# Patient Record
Sex: FEMALE | Race: ASIAN | URBAN - METROPOLITAN AREA
[De-identification: names, ages, dates, MRNs, and addresses within clinical notes are randomized per-mention and may not be internally consistent; named-entity substitution may affect disease eponyms.]

---

## 2020-11-18 ENCOUNTER — OFFICE VISIT (OUTPATIENT)
Dept: URBAN - METROPOLITAN AREA CLINIC 27 | Facility: CLINIC | Age: 65
End: 2020-11-18
Payer: MEDICARE

## 2020-11-18 DIAGNOSIS — E11.9 TYPE 2 DM W/O COMPLICATION: ICD-10-CM

## 2020-11-18 DIAGNOSIS — H25.12 AGE-RELATED NUCLEAR CATARACT, LEFT EYE: ICD-10-CM

## 2020-11-18 DIAGNOSIS — H04.123 DRY EYE SYNDROME OF BILATERAL LACRIMAL GLANDS: ICD-10-CM

## 2020-11-18 PROCEDURE — 92235 FLUORESCEIN ANGRPH MLTIFRAME: CPT | Performed by: OPHTHALMOLOGY

## 2020-11-18 PROCEDURE — 92134 CPTRZ OPH DX IMG PST SGM RTA: CPT | Performed by: OPHTHALMOLOGY

## 2020-11-18 PROCEDURE — 99214 OFFICE O/P EST MOD 30 MIN: CPT | Performed by: OPHTHALMOLOGY

## 2020-11-18 ASSESSMENT — INTRAOCULAR PRESSURE
OD: 15
OS: 18

## 2020-11-18 NOTE — IMPRESSION/PLAN
Impression: Type 2 DM w/o complications, OU. Status: Symptomatic. Plan: DM since 2016. An IVFA from 05/08/2020 demonstrated no NVE OU. Fundus photos from 05/08/2020 demonstrated no NVD. No BDR. Bs control. Observe.

## 2020-11-18 NOTE — IMPRESSION/PLAN
Impression: Retinal edema, OD. Status: Symptomatic. JXT OD Plan: Exam and OCT reveal macular thickening OD (353 microns, from 480 microns). An IVFA from 11/18/2020 demonstrated mild telangiectasia. Fundus photos from 11/18/2020 demonstrated no IRH. If there is worsening, will consider Triesence. Thanks, Ruel Conroy. 

Return in 6 months, OCT OU, IVFA OD 1st, possible Triesence

## 2020-11-18 NOTE — IMPRESSION/PLAN
Impression: h/o ERM, OD. Status: Symptomatic.
s/p PPV / MP ILM / ICG / PRP / air 2/5/18 ([address] [place] [address]) Plan: The patient has done well with surgery. Follow.

## 2021-06-30 ENCOUNTER — OFFICE VISIT (OUTPATIENT)
Dept: URBAN - METROPOLITAN AREA CLINIC 27 | Facility: CLINIC | Age: 66
End: 2021-06-30
Payer: MEDICARE

## 2021-06-30 DIAGNOSIS — H43.813 VITREOUS DEGENERATION, BILATERAL: ICD-10-CM

## 2021-06-30 PROCEDURE — 99214 OFFICE O/P EST MOD 30 MIN: CPT | Performed by: OPHTHALMOLOGY

## 2021-06-30 PROCEDURE — 92235 FLUORESCEIN ANGRPH MLTIFRAME: CPT | Performed by: OPHTHALMOLOGY

## 2021-06-30 PROCEDURE — 92134 CPTRZ OPH DX IMG PST SGM RTA: CPT | Performed by: OPHTHALMOLOGY

## 2021-06-30 ASSESSMENT — INTRAOCULAR PRESSURE
OD: 15
OS: 16

## 2021-06-30 NOTE — IMPRESSION/PLAN
Impression: Type 2 DM w/o complications, OU. Status: Symptomatic. Plan: DM since 2016. An IVFA from 06/30/2021 demonstrated no NVE OU. Fundus photos from 06/30/2021 demonstrated no NVD. No BDR. Bs control. Observe.

## 2021-06-30 NOTE — IMPRESSION/PLAN
Impression: h/o ERM, OD. Status: Symptomatic.
s/p PPV / MP ILM / ICG / PRP / air 2/5/18 ([address] [place] [address]) Plan: The patient has done well with surgery. Follow.

## 2021-06-30 NOTE — IMPRESSION/PLAN
Impression: Retinal edema, OD. Status: Symptomatic. JXT OD Plan: Exam and OCT reveal macular thickening OD (370 microns, from 480 microns). An IVFA from 06/30/2021 demonstrated mild telangiectasia. Fundus photos from 06/30/2021 demonstrated no IRH. If there is worsening, will consider Triesence. Thanks, Hussein Luna. 
 
Return in 6 months, OCT OU, IVFA OD 1st (benadryl), possible Triesence

## 2021-12-15 ENCOUNTER — OFFICE VISIT (OUTPATIENT)
Dept: URBAN - METROPOLITAN AREA CLINIC 27 | Facility: CLINIC | Age: 66
End: 2021-12-15
Payer: MEDICARE

## 2021-12-15 DIAGNOSIS — H35.371 PUCKERING OF MACULA, RIGHT EYE: ICD-10-CM

## 2021-12-15 DIAGNOSIS — H35.341 MACULAR CYST, HOLE, OR PSEUDOHOLE, RIGHT EYE: ICD-10-CM

## 2021-12-15 DIAGNOSIS — H35.81 RETINAL EDEMA: Primary | ICD-10-CM

## 2021-12-15 PROCEDURE — 99214 OFFICE O/P EST MOD 30 MIN: CPT | Performed by: OPHTHALMOLOGY

## 2021-12-15 PROCEDURE — 92134 CPTRZ OPH DX IMG PST SGM RTA: CPT | Performed by: OPHTHALMOLOGY

## 2021-12-15 PROCEDURE — 92235 FLUORESCEIN ANGRPH MLTIFRAME: CPT | Performed by: OPHTHALMOLOGY

## 2021-12-15 PROCEDURE — 92250 FUNDUS PHOTOGRAPHY W/I&R: CPT | Performed by: OPHTHALMOLOGY

## 2021-12-15 ASSESSMENT — INTRAOCULAR PRESSURE
OS: 15
OD: 14

## 2021-12-15 NOTE — IMPRESSION/PLAN
Impression: Retinal edema, OD. Status: Symptomatic. JXT OD Plan: Exam and OCT reveal macular thickening OD (342 microns, from 480 microns). An IVFA (with benadryl) from 12/15/2021 demonstrated mild telangiectasia. Fundus photos from 12/15/2021 demonstrated no IRH. If there is worsening, will consider Triesence. Thanks, Starlette Number. 
 
Return in 6 months, OCT OU, possible Triesence

## 2021-12-15 NOTE — IMPRESSION/PLAN
Impression: h/o ERM, OD. Status: Symptomatic.
s/p PPV / MP ILM / ICG / PRP / air 2/5/18 ([address] [place] [address]) Plan: The patient has done well with surgery. Follow.

## 2021-12-15 NOTE — IMPRESSION/PLAN
Impression: Type 2 DM w/o complications, OU. Status: Symptomatic. Plan: DM since 2016. An IVFA from 06/30/2021 demonstrated no NVE OU. Fundus photos from 12/15/2021 demonstrated no NVD. No BDR. Bs control. Observe.